# Patient Record
Sex: MALE
[De-identification: names, ages, dates, MRNs, and addresses within clinical notes are randomized per-mention and may not be internally consistent; named-entity substitution may affect disease eponyms.]

---

## 2019-08-06 PROBLEM — Z00.00 ENCOUNTER FOR PREVENTIVE HEALTH EXAMINATION: Status: ACTIVE | Noted: 2019-08-06

## 2019-08-12 ENCOUNTER — APPOINTMENT (OUTPATIENT)
Dept: COLORECTAL SURGERY | Facility: CLINIC | Age: 24
End: 2019-08-12
Payer: COMMERCIAL

## 2019-08-12 VITALS
WEIGHT: 223 LBS | BODY MASS INDEX: 29.55 KG/M2 | SYSTOLIC BLOOD PRESSURE: 145 MMHG | HEART RATE: 97 BPM | DIASTOLIC BLOOD PRESSURE: 91 MMHG | HEIGHT: 73 IN | TEMPERATURE: 98.5 F

## 2019-08-12 DIAGNOSIS — Z87.19 PERSONAL HISTORY OF OTHER DISEASES OF THE DIGESTIVE SYSTEM: ICD-10-CM

## 2019-08-12 PROCEDURE — 46600 DIAGNOSTIC ANOSCOPY SPX: CPT

## 2019-08-12 PROCEDURE — 99202 OFFICE O/P NEW SF 15 MIN: CPT | Mod: 25

## 2019-08-12 RX ORDER — AMITRIPTYLINE HYDROCHLORIDE 25 MG/1
25 TABLET, FILM COATED ORAL
Refills: 0 | Status: ACTIVE | COMMUNITY

## 2019-08-12 RX ORDER — CHORIONIC GONADOTROPIN, HUMAN 50000 UNIT
VIAL (EA) INJECTION
Refills: 0 | Status: ACTIVE | COMMUNITY

## 2019-08-12 RX ORDER — METRONIDAZOLE 250 MG/1
250 TABLET ORAL
Refills: 0 | Status: ACTIVE | COMMUNITY

## 2019-08-16 NOTE — HISTORY OF PRESENT ILLNESS
[FreeTextEntry1] : 24 y/o M presents for evaluation of multiple perianal abscess \par First noticed single abscess several months ago, 2 additional abscess have developed in the interim\par Reports one abscess is currently swollen and not draining, the other 2 are draining pus\par Denies fever, chills or odor\par Reports small amount of BPR on the TP with most BM's, has seen blood in the bowel twice\par BH: Daily\par Admits to constipation, has had fiber in to diet\par No sitz baths\par Currently on Flagyl 250 mg QID, given script for 1 week worth\par Never had a colonoscopy\par Denies FMH of GI disorders or GI cancers\par No ASA/NSAIDs last 7 days \par \par Reports abnormal blood tests performed by GI consistent with possible crohns disease.

## 2019-08-16 NOTE — CONSULT LETTER
[Dear  ___] : Dear  [unfilled], [Consult Letter:] : I had the pleasure of evaluating your patient, [unfilled]. [Please see my note below.] : Please see my note below. [( Thank you for referring [unfilled] for consultation for _____ )] : Thank you for referring [unfilled] for consultation for [unfilled] [Consult Closing:] : Thank you very much for allowing me to participate in the care of this patient.  If you have any questions, please do not hesitate to contact me. [Sincerely,] : Sincerely, [FreeTextEntry2] : LEFTY ACUÑA\par [address]  \[place] [address]\par  [FreeTextEntry3] : ARNOLDO SAENZ MD

## 2019-08-16 NOTE — PHYSICAL EXAM
[Fistula] : a fistula [Normal] : was normal [None] : there was no rectal mass  [de-identified] : There is evidence of drained abscess along the left and right anal margin as well as a secondary fistula opening along the right medial buttock. No significant erythema or evidence of fluctuance. [FreeTextEntry1] : A lighted anoscope was passed into the anal canal and the entire anal mucosal surface was inspected..  THe findings revealed moderate internal hemorrhoids. No masses or lesions were identified.\par \par

## 2019-08-16 NOTE — ASSESSMENT
[FreeTextEntry1] : Recommend MRI for further evaluation and assessment of complex anal fistula disease. I discussed with the patient my concern regarding her atrial this process and findings are consistent with underlying Crohn's disease. I have strongly recommended he returned to his gastroenterologist for a colonoscopy/ileal biopsy.\par \par I have detailed to him that if this is Crohn disease and there is no undrained abscess he would benefit from medical therapy. However if an abscess is identified on MRI we will proceed with exam under anesthesia drainage and seton insertion.\par

## 2019-08-21 ENCOUNTER — FORM ENCOUNTER (OUTPATIENT)
Age: 24
End: 2019-08-21

## 2019-08-22 ENCOUNTER — APPOINTMENT (OUTPATIENT)
Dept: MRI IMAGING | Facility: CLINIC | Age: 24
End: 2019-08-22
Payer: COMMERCIAL

## 2019-08-22 ENCOUNTER — OUTPATIENT (OUTPATIENT)
Dept: OUTPATIENT SERVICES | Facility: HOSPITAL | Age: 24
LOS: 1 days | End: 2019-08-22

## 2019-08-22 ENCOUNTER — TRANSCRIPTION ENCOUNTER (OUTPATIENT)
Age: 24
End: 2019-08-22

## 2019-08-22 PROCEDURE — 72197 MRI PELVIS W/O & W/DYE: CPT | Mod: 26

## 2019-09-09 ENCOUNTER — APPOINTMENT (OUTPATIENT)
Dept: COLORECTAL SURGERY | Facility: CLINIC | Age: 24
End: 2019-09-09
Payer: COMMERCIAL

## 2019-09-09 VITALS
BODY MASS INDEX: 29.95 KG/M2 | HEART RATE: 86 BPM | SYSTOLIC BLOOD PRESSURE: 143 MMHG | HEIGHT: 73 IN | WEIGHT: 226 LBS | DIASTOLIC BLOOD PRESSURE: 85 MMHG | TEMPERATURE: 98.7 F

## 2019-09-09 DIAGNOSIS — K60.3 ANAL FISTULA: ICD-10-CM

## 2019-09-09 PROCEDURE — 99214 OFFICE O/P EST MOD 30 MIN: CPT

## 2019-09-13 NOTE — ASSESSMENT
[FreeTextEntry1] : Complex anal fistula with fissure.\par \par I reviewed with the patient the findings are consistent with anal manifestation of Crohn's disease. I recommend that the patient be seen by gastroenterology for further workup evaluation and treatment. However, he developed pain or increasing discharge he should return for further treatment of his anal fistula disease. The risk of possible perirectal abscess and the appropriate treatment for its development were detailed.\par

## 2019-09-13 NOTE — PHYSICAL EXAM
[Fistula] : a fistula [Normal] : was normal [None] : there was no rectal mass  [de-identified] : Broad anterior fissure [de-identified] : There is evidence of the left and right anal  fistula. No significant erythema or evidence of fluctuance.

## 2019-09-13 NOTE — CONSULT LETTER
[Dear  ___] : Dear  [unfilled], [Consult Letter:] : I had the pleasure of evaluating your patient, [unfilled]. [( Thank you for referring [unfilled] for consultation for _____ )] : Thank you for referring [unfilled] for consultation for [unfilled] [Please see my note below.] : Please see my note below. [Consult Closing:] : Thank you very much for allowing me to participate in the care of this patient.  If you have any questions, please do not hesitate to contact me. [Sincerely,] : Sincerely, [FreeTextEntry2] : LEFTY ACUÑA\[address]\[place] [id number] [FreeTextEntry3] : ARNOLDO SAENZ MD

## 2019-09-13 NOTE — HISTORY OF PRESENT ILLNESS
[FreeTextEntry1] : Return in followup.  \par \par No pain or discharge.\par MRI-  Complex right and anterior fistula.\par \par \par

## 2024-11-19 ENCOUNTER — APPOINTMENT (OUTPATIENT)
Dept: UROLOGY | Facility: CLINIC | Age: 29
End: 2024-11-19
Payer: MEDICAID

## 2024-11-19 VITALS
SYSTOLIC BLOOD PRESSURE: 152 MMHG | WEIGHT: 226 LBS | TEMPERATURE: 98.2 F | HEART RATE: 74 BPM | DIASTOLIC BLOOD PRESSURE: 84 MMHG | HEIGHT: 73 IN | BODY MASS INDEX: 29.95 KG/M2

## 2024-11-19 DIAGNOSIS — E23.0 HYPOPITUITARISM: ICD-10-CM

## 2024-11-19 PROCEDURE — G2211 COMPLEX E/M VISIT ADD ON: CPT | Mod: NC

## 2024-11-19 PROCEDURE — 99204 OFFICE O/P NEW MOD 45 MIN: CPT

## 2024-11-19 RX ORDER — CHORIOGONADOTROPIN ALFA 250 UG/.5ML
250 INJECTION, SOLUTION SUBCUTANEOUS
Qty: 4 | Refills: 0 | Status: ACTIVE | COMMUNITY
Start: 2024-11-19 | End: 1900-01-01

## 2024-11-19 RX ORDER — MENOTROPINS 75 UNIT
75 KIT SUBCUTANEOUS
Qty: 24 | Refills: 0 | Status: ACTIVE | COMMUNITY
Start: 2024-11-19 | End: 1900-01-01

## 2025-03-12 ENCOUNTER — NON-APPOINTMENT (OUTPATIENT)
Age: 30
End: 2025-03-12

## 2025-03-13 ENCOUNTER — NON-APPOINTMENT (OUTPATIENT)
Age: 30
End: 2025-03-13